# Patient Record
Sex: MALE
[De-identification: names, ages, dates, MRNs, and addresses within clinical notes are randomized per-mention and may not be internally consistent; named-entity substitution may affect disease eponyms.]

---

## 2020-08-05 ENCOUNTER — LABORATORY RESULT (OUTPATIENT)
Age: 40
End: 2020-08-05

## 2020-08-05 ENCOUNTER — APPOINTMENT (OUTPATIENT)
Dept: UROLOGY | Facility: CLINIC | Age: 40
End: 2020-08-05
Payer: MEDICAID

## 2020-08-05 VITALS
DIASTOLIC BLOOD PRESSURE: 85 MMHG | HEIGHT: 69 IN | WEIGHT: 215 LBS | OXYGEN SATURATION: 98 % | TEMPERATURE: 97.1 F | BODY MASS INDEX: 31.84 KG/M2 | HEART RATE: 62 BPM | SYSTOLIC BLOOD PRESSURE: 120 MMHG

## 2020-08-05 DIAGNOSIS — Z63.5 DISRUPTION OF FAMILY BY SEPARATION AND DIVORCE: ICD-10-CM

## 2020-08-05 DIAGNOSIS — Z78.9 OTHER SPECIFIED HEALTH STATUS: ICD-10-CM

## 2020-08-05 DIAGNOSIS — I86.1 SCROTAL VARICES: ICD-10-CM

## 2020-08-05 PROBLEM — Z00.00 ENCOUNTER FOR PREVENTIVE HEALTH EXAMINATION: Status: ACTIVE | Noted: 2020-08-05

## 2020-08-05 PROCEDURE — 99203 OFFICE O/P NEW LOW 30 MIN: CPT

## 2020-08-05 SDOH — SOCIAL STABILITY - SOCIAL INSECURITY: DISRUPTION OF FAMILY BY SEPARATION AND DIVORCE: Z63.5

## 2020-08-05 NOTE — HISTORY OF PRESENT ILLNESS
[None] : no symptoms [Erectile Dysfunction] : no Erectile Dysfunction [FreeTextEntry1] : Patient Ace Peralta\par Job: \par Patient has 3 children with another partner (20,19,8)\par Partner Name: Angelina ()  44 -did not attempt to conceive\par Partner Age: Current partner (Magda ) together 3 months; no children; never \par Prior marriage:  but  from Angelina\par Current partner has tubal disease going though IVF "she just needs the sperm from me"\par Sexual dysfunction: none\par \par Exposure history: no tobacco , no drugs; occassional ETOH\par

## 2020-08-05 NOTE — PHYSICAL EXAM
[Normal Appearance] : normal appearance [Abdomen Tenderness] : non-tender [Edema] : no peripheral edema [Penis Abnormality] : normal uncircumcised penis [Testes Tenderness] : no tenderness of the testes [Epididymis] : the epididymides were normal [Testes Mass (___cm)] : there were no testicular masses [Normal Station and Gait] : the gait and station were normal for the patient's age [Skin Color & Pigmentation] : normal skin color and pigmentation [Oriented To Time, Place, And Person] : oriented to person, place, and time [Inguinal Lymph Nodes Enlarged Bilaterally] : inguinal [FreeTextEntry1] : vas x 2 ; left mild penile curvature; cord fullness left; vas x 2

## 2020-08-05 NOTE — LETTER BODY
[FreeTextEntry2] : Dear DEXTER Sin\par [address] [facility]\par [address] [facility]\par [place] [id number] [FreeTextEntry1] : Dear Doctor Dalton ,\par \par Thank you for your kind referral.  I am enclosing a copy of my office note for your information.\par \par I will keep you informed of any developments.\par \par Feel free to contact me if you have any questions.\par \par Sincerely,\par \par Rodney Snell MD, FACS\par Professor of Urology\par [employer] [facility] [employer]\par \par [address] [facility]\par [place], [place]  [address]\par \par [address]\par [place], [place] [address]\par \par Office Telephone \par [phone]\par [phone]\par

## 2020-08-05 NOTE — ASSESSMENT
[FreeTextEntry1] : Ace Peralta presents for assessment prior to proceeding with semen analysis \par He has documented fertility \par His current partner is 43 with primary infertility\par Proceeding with IVF in [facility]\par Will proceed with semen analysis and endocrine studies\par Will proceed with Karyotype at request of IVF center\par Discussed penile curvature ; not symptomatic; (see photo)\par DIscussed PSA screening.  Patient does NOT want to proceed.\par The ACE PERALTA  expressed fully understanding of the information provided, the consequences and the management.\par \par discussed possibility of varicocele on exam and potential significance\par he will return after semen analysis and proceed with ultrasound if indicated\par \par Will proceed with Karyotype today\par \par

## 2020-08-17 ENCOUNTER — APPOINTMENT (OUTPATIENT)
Dept: HUMAN REPRODUCTION | Facility: CLINIC | Age: 40
End: 2020-08-17

## 2020-08-19 ENCOUNTER — APPOINTMENT (OUTPATIENT)
Dept: UROLOGY | Facility: CLINIC | Age: 40
End: 2020-08-19